# Patient Record
Sex: MALE | Race: WHITE | NOT HISPANIC OR LATINO | Employment: FULL TIME | URBAN - METROPOLITAN AREA
[De-identification: names, ages, dates, MRNs, and addresses within clinical notes are randomized per-mention and may not be internally consistent; named-entity substitution may affect disease eponyms.]

---

## 2022-02-05 ENCOUNTER — APPOINTMENT (EMERGENCY)
Dept: RADIOLOGY | Facility: HOSPITAL | Age: 32
End: 2022-02-05
Payer: COMMERCIAL

## 2022-02-05 ENCOUNTER — HOSPITAL ENCOUNTER (EMERGENCY)
Facility: HOSPITAL | Age: 32
Discharge: HOME | End: 2022-02-05
Attending: EMERGENCY MEDICINE
Payer: COMMERCIAL

## 2022-02-05 VITALS
HEART RATE: 85 BPM | RESPIRATION RATE: 20 BRPM | HEIGHT: 71 IN | BODY MASS INDEX: 25.2 KG/M2 | DIASTOLIC BLOOD PRESSURE: 80 MMHG | WEIGHT: 180 LBS | TEMPERATURE: 99 F | SYSTOLIC BLOOD PRESSURE: 125 MMHG | OXYGEN SATURATION: 99 %

## 2022-02-05 DIAGNOSIS — S43.015A ANTERIOR SHOULDER DISLOCATION, LEFT, INITIAL ENCOUNTER: Primary | ICD-10-CM

## 2022-02-05 PROCEDURE — 73020 X-RAY EXAM OF SHOULDER: CPT

## 2022-02-05 PROCEDURE — 99283 EMERGENCY DEPT VISIT LOW MDM: CPT

## 2022-02-05 PROCEDURE — 73030 X-RAY EXAM OF SHOULDER: CPT

## 2022-02-05 PROCEDURE — 99285 EMERGENCY DEPT VISIT HI MDM: CPT | Performed by: PHYSICIAN ASSISTANT

## 2022-02-05 PROCEDURE — 23650 CLTX SHO DSLC W/MNPJ WO ANES: CPT | Performed by: PHYSICIAN ASSISTANT

## 2022-02-05 RX ORDER — FENTANYL CITRATE 50 UG/ML
75 INJECTION, SOLUTION INTRAMUSCULAR; INTRAVENOUS ONCE
Status: COMPLETED | OUTPATIENT
Start: 2022-02-05 | End: 2022-02-05

## 2022-02-05 RX ORDER — MIDAZOLAM HYDROCHLORIDE 2 MG/2ML
2 INJECTION, SOLUTION INTRAMUSCULAR; INTRAVENOUS ONCE
Status: COMPLETED | OUTPATIENT
Start: 2022-02-05 | End: 2022-02-05

## 2022-02-05 RX ORDER — BUPIVACAINE HYDROCHLORIDE 2.5 MG/ML
10 INJECTION, SOLUTION EPIDURAL; INFILTRATION; INTRACAUDAL ONCE
Status: COMPLETED | OUTPATIENT
Start: 2022-02-05 | End: 2022-02-05

## 2022-02-05 RX ORDER — IBUPROFEN 600 MG/1
600 TABLET ORAL EVERY 6 HOURS PRN
Qty: 30 TABLET | Refills: 0 | Status: SHIPPED | OUTPATIENT
Start: 2022-02-05

## 2022-02-05 RX ADMIN — BUPIVACAINE HYDROCHLORIDE 10 ML: 2.5 INJECTION, SOLUTION EPIDURAL; INFILTRATION; INTRACAUDAL; PERINEURAL at 18:28

## 2022-02-05 RX ADMIN — FENTANYL CITRATE 75 MCG: 50 INJECTION INTRAMUSCULAR; INTRAVENOUS at 18:07

## 2022-02-05 RX ADMIN — MIDAZOLAM 2 MG: 1 INJECTION INTRAMUSCULAR; INTRAVENOUS at 18:07

## 2022-02-05 NOTE — DISCHARGE INSTRUCTIONS
Ibuprofen every 6 hours as needed for pain  You may also take a 1000 mg of Tylenol 3 times daily  It is safe to take with ibuprofen  You may also apply ice  Stay in sling until cleared by Orthopedics  Return to the ER with any significant change or worsening of your symptoms

## 2022-02-06 NOTE — ED PROVIDER NOTES
History  Chief Complaint   Patient presents with    Shoulder Injury     states his shoulder popped out while pulling a bow back to shoot     26-year-old male history of right shoulder dislocation presents complaining of left shoulder pain  Patient states this feels exactly like his prior shoulder dislocations  Reports that he was drawing back on his bone marrow and felt his shoulder dislocate  Denies any other trauma  Has not yet taken anything for pain  Happened approximately 1 hour prior to arrival   Denies any other complaints or concerns at this time  None       History reviewed  No pertinent past medical history  History reviewed  No pertinent surgical history  History reviewed  No pertinent family history  I have reviewed and agree with the history as documented  E-Cigarette/Vaping     E-Cigarette/Vaping Substances     Social History     Tobacco Use    Smoking status: Current Every Day Smoker     Packs/day: 0 50    Smokeless tobacco: Never Used   Substance Use Topics    Alcohol use: Not Currently    Drug use: Never       Review of Systems   Constitutional: Negative for chills, fatigue and fever  HENT: Negative for congestion and sore throat  Eyes: Negative for pain  Respiratory: Negative for cough, chest tightness, shortness of breath and wheezing  Cardiovascular: Negative for chest pain, palpitations and leg swelling  Gastrointestinal: Negative for abdominal pain, constipation, diarrhea, nausea and vomiting  Endocrine: Negative for polyuria  Genitourinary: Negative for dysuria  Musculoskeletal: Positive for arthralgias  Negative for back pain, myalgias and neck pain  Skin: Negative for rash  Neurological: Negative for dizziness, syncope, light-headedness and headaches  All other systems reviewed and are negative  Physical Exam  Physical Exam  Vitals reviewed  Constitutional:       Appearance: Normal appearance  He is well-developed     HENT: Head: Normocephalic and atraumatic  Mouth/Throat:      Mouth: Mucous membranes are moist    Eyes:      Conjunctiva/sclera: Conjunctivae normal    Cardiovascular:      Rate and Rhythm: Normal rate and regular rhythm  Heart sounds: Normal heart sounds  Pulmonary:      Effort: Pulmonary effort is normal       Breath sounds: Normal breath sounds  Abdominal:      General: Bowel sounds are normal       Palpations: Abdomen is soft  Tenderness: There is no abdominal tenderness  Musculoskeletal:      Cervical back: Normal range of motion  Comments: Left humeral head palpable with anterior deformity  Arm at patient's side  5/5 strength in wrist extension  2+ radial pulse  Brisk cap refill  Distal sensation intact  No deltoid numbness  Skin:     General: Skin is warm and dry  Capillary Refill: Capillary refill takes less than 2 seconds  Neurological:      General: No focal deficit present  Mental Status: He is alert and oriented to person, place, and time     Psychiatric:         Mood and Affect: Mood normal          Behavior: Behavior normal          Vital Signs  ED Triage Vitals [02/05/22 1722]   Temperature Pulse Respirations Blood Pressure SpO2   99 °F (37 2 °C) 85 20 125/80 99 %      Temp Source Heart Rate Source Patient Position - Orthostatic VS BP Location FiO2 (%)   Temporal Monitor Sitting Left arm --      Pain Score       10 - Worst Possible Pain           Vitals:    02/05/22 1722   BP: 125/80   Pulse: 85   Patient Position - Orthostatic VS: Sitting         Visual Acuity      ED Medications  Medications   fentanyl citrate (PF) 100 MCG/2ML 75 mcg (75 mcg Intravenous Given 2/5/22 1807)   midazolam (VERSED) injection 2 mg (2 mg Intravenous Given 2/5/22 1807)   bupivacaine (PF) (MARCAINE) 0 25 % injection 10 mL (10 mL Infiltration Given 2/5/22 1828)       Diagnostic Studies  Results Reviewed     None                 XR shoulder 2+ views LEFT   ED Interpretation by Iris Liang JAREN Dalal (02/05 1807)   Anterior glenohumeral dislocation      XR shoulder 1 vw LEFT POST REDUCTION    (Results Pending)              Procedures  Orthopedic injury treatment    Date/Time: 2/5/2022 8:56 PM  Performed by: Ghanshyam Ocasio PA-C  Authorized by: Ghanshyam Ocasio PA-C     Patient Location:  ED  Other Assisting Provider: Yes (comment)    Consent given by:  Patient  Patient states understanding of procedure being performed: Yes    Patient identity confirmed:  Verbally with patient  Injury location:  Shoulder  Location details:  Left shoulder  Injury type:  Dislocation  Dislocation type: anterior    Chronicity:  New  Neurovascular status: Neurovascularly intact    Distal perfusion: normal    Neurological function: normal    Range of motion: normal    Local anesthesia used?: Yes    General anesthesia used?: No    Local anesthetic:  Lidocaine 1% without epinephrine  Skeletal traction used?: Yes    Immobilization:  Sling  Neurovascular status: Neurovascularly intact    Distal perfusion: normal    Neurological function: normal    Range of motion: normal    Patient tolerance:  Patient tolerated the procedure well with no immediate complications   Utilized Garcia technique with successful reduction             ED Course                                             MDM  Number of Diagnoses or Management Options  Anterior shoulder dislocation, left, initial encounter  Diagnosis management comments: Patient presented with anterior left shoulder dislocation  This is his 1st dislocation  Gave intra-articular bupivacaine with adequate relief of his pain  Versed fentanyl use  Cunningham technique utilized with successful reduction  Patient placed in splint  Referred to orthopedics  All questions were answered he was agreeable to plan  Wife will drive him home        Disposition  Final diagnoses:   Anterior shoulder dislocation, left, initial encounter     Time reflects when diagnosis was documented in both MDM as applicable and the Disposition within this note     Time User Action Codes Description Comment    2/5/2022  6:42 PM Neno Ziegler Add [[id number] [id number]] Anterior shoulder dislocation, left, initial encounter       ED Disposition     ED Disposition Condition Date/Time Comment    Discharge Stable Sat Feb 5, 2022  6:42 PM Coral Furth discharge to home/self care              Follow-up Information    None         Discharge Medication List as of 2/5/2022  6:46 PM      START taking these medications    Details   ibuprofen (MOTRIN) 600 mg tablet Take 1 tablet (600 mg total) by mouth every 6 (six) hours as needed for mild pain, Starting Sat 2/5/2022, Normal                 PDMP Review     None          ED Provider  Electronically Signed by           Briana Kennedy PA-C  02/05/22 2059

## 2022-02-14 VITALS
BODY MASS INDEX: 25.2 KG/M2 | HEIGHT: 71 IN | WEIGHT: 180 LBS | SYSTOLIC BLOOD PRESSURE: 128 MMHG | DIASTOLIC BLOOD PRESSURE: 78 MMHG

## 2022-02-14 DIAGNOSIS — S43.432A TEAR OF LEFT GLENOID LABRUM, INITIAL ENCOUNTER: Primary | ICD-10-CM

## 2022-02-14 DIAGNOSIS — S43.015A ANTERIOR SHOULDER DISLOCATION, LEFT, INITIAL ENCOUNTER: ICD-10-CM

## 2022-02-14 PROCEDURE — 99203 OFFICE O/P NEW LOW 30 MIN: CPT | Performed by: ORTHOPAEDIC SURGERY

## 2022-02-14 NOTE — PROGRESS NOTES
ASSESSMENT/PLAN:    Diagnoses and all orders for this visit:    Tear of left glenoid labrum, initial encounter  -     MRI arthrogram left shoulder; Future  -     FL injection left shoulder (arthrogram); Future    Anterior shoulder dislocation, left, initial encounter  -     Ambulatory Referral to Orthopedic Surgery  -     MRI arthrogram left shoulder; Future  -     FL injection left shoulder (arthrogram); Future        The patient's shoulder is now reduced  I am concerned, however, that the patient has a tear of his labrum  We will order an MRI arthrogram to rule this out  He will follow up with our office once the MRI arthrogram is complete  The patient is acceptable to this plan  Return for MRI results  The patient dislocated left shoulder  There is a possibility he may have a tear of his labrum  An MRI arthrogram is the gold standard to look for this  Return back once this is complete  Physical exam shows tenderness along his anterior shoulder with a positive speed's test indicative of labral tearing  The shoulder feels contained today  Rotator cuff strength testing was grossly intact      _____________________________________________________  CHIEF COMPLAINT:  Chief Complaint   Patient presents with    Left Shoulder - Dislocation         SUBJECTIVE:  Serge Han is a 27 y o  male who presents to our office complaining of left shoulder pain  The patient states that on 02/05/2022 he was pulling back on a bow when his left shoulder dislocated  At that point, he went to the emergency department were a successful reduction was achieved  He states that he has also had a shoulder dislocation of his right shoulder in the past   He denies any numbness or tingling  He denies any fever or chills        The following portions of the patient's history were reviewed and updated as appropriate: allergies, current medications, past family history, past medical history, past social history, past surgical history and problem list     PAST MEDICAL HISTORY:  History reviewed  No pertinent past medical history  PAST SURGICAL HISTORY:  Past Surgical History:   Procedure Laterality Date    MOUTH SURGERY         FAMILY HISTORY:  History reviewed  No pertinent family history  SOCIAL HISTORY:  Social History     Tobacco Use    Smoking status: Current Every Day Smoker     Packs/day: 0 50    Smokeless tobacco: Never Used   Substance Use Topics    Alcohol use: Not Currently    Drug use: Never       MEDICATIONS:    Current Outpatient Medications:     ibuprofen (MOTRIN) 600 mg tablet, Take 1 tablet (600 mg total) by mouth every 6 (six) hours as needed for mild pain (Patient not taking: Reported on 2/14/2022 ), Disp: 30 tablet, Rfl: 0    ALLERGIES:  No Known Allergies    ROS:  Review of Systems     Constitutional: Negative for fatigue, fever or loss of appetite  HENT: Negative  Respiratory: Negative for shortness of breath, dyspnea  Cardiovascular: Negative for chest pain/tightness  Gastrointestinal: Negative for abdominal pain, N/V  Endocrine: Negative for cold/heat intolerance, unexplained weight loss/gain  Genitourinary: Negative for flank pain, dysuria, hematuria  Musculoskeletal: Positive for arthralgia   Skin: Negative for rash  Neurological: Negative for numbness or tingling  Psychiatric/Behavioral: Negative for agitation  _____________________________________________________  PHYSICAL EXAMINATION:    Blood pressure 128/78, height 5' 11" (1 803 m), weight 81 6 kg (180 lb)  Constitutional: Oriented to person, place, and time  Appears well-developed and well-nourished  No distress  HENT:   Head: Normocephalic  Eyes: Conjunctivae are normal  Right eye exhibits no discharge  Left eye exhibits no discharge  No scleral icterus  Cardiovascular: Normal rate  Pulmonary/Chest: Effort normal    Neurological: Alert and oriented to person, place, and time  Skin: Skin is warm and dry   No rash noted  Not diaphoretic  No erythema  No pallor  Psychiatric: Normal mood and affect  Behavior is normal  Judgment and thought content normal       MUSCULOSKELETAL EXAMINATION:   Physical Exam  Ortho Exam    Left upper extremity is neurovascular intact  Fingers are pink and mobile  Compartments are soft  There is apprehension along the shoulder  Range of motion of the shoulder is reduced  Brisk cap refill  Sensation intact  Objective:  BP Readings from Last 1 Encounters:   02/14/22 128/78      Wt Readings from Last 1 Encounters:   02/14/22 81 6 kg (180 lb)        BMI:   Estimated body mass index is 25 1 kg/m² as calculated from the following:    Height as of this encounter: 5' 11" (1 803 m)  Weight as of this encounter: 81 6 kg (180 lb)            Elmo Agudelo PA-C

## 2022-02-21 ENCOUNTER — HOSPITAL ENCOUNTER (OUTPATIENT)
Dept: MRI IMAGING | Facility: HOSPITAL | Age: 32
Discharge: HOME | End: 2022-02-21
Attending: PHYSICIAN ASSISTANT
Payer: COMMERCIAL

## 2022-02-21 ENCOUNTER — HOSPITAL ENCOUNTER (OUTPATIENT)
Dept: RADIOLOGY | Facility: HOSPITAL | Age: 32
Discharge: HOME | End: 2022-02-21
Attending: PHYSICIAN ASSISTANT | Admitting: RADIOLOGY
Payer: COMMERCIAL

## 2022-02-21 DIAGNOSIS — S43.432A TEAR OF LEFT GLENOID LABRUM, INITIAL ENCOUNTER: ICD-10-CM

## 2022-02-21 DIAGNOSIS — S43.015A ANTERIOR SHOULDER DISLOCATION, LEFT, INITIAL ENCOUNTER: ICD-10-CM

## 2022-02-21 PROCEDURE — 73222 MRI JOINT UPR EXTREM W/DYE: CPT

## 2022-02-21 PROCEDURE — G1004 CDSM NDSC: HCPCS

## 2022-02-21 PROCEDURE — 23350 INJECTION FOR SHOULDER X-RAY: CPT

## 2022-02-21 PROCEDURE — A9585 GADOBUTROL INJECTION: HCPCS | Performed by: PHYSICIAN ASSISTANT

## 2022-02-21 PROCEDURE — 77002 NEEDLE LOCALIZATION BY XRAY: CPT

## 2022-02-21 RX ORDER — LIDOCAINE WITH 8.4% SOD BICARB 0.9%(10ML)
5 SYRINGE (ML) INJECTION ONCE
Status: DISCONTINUED | OUTPATIENT
Start: 2022-02-21 | End: 2022-02-25 | Stop reason: HOSPADM

## 2022-02-21 RX ADMIN — GADOBUTROL 2 ML: 604.72 INJECTION INTRAVENOUS at 14:57

## 2022-02-21 RX ADMIN — IOHEXOL 5 ML: 300 INJECTION, SOLUTION INTRAVENOUS at 14:39

## 2022-02-28 VITALS
DIASTOLIC BLOOD PRESSURE: 76 MMHG | HEIGHT: 71 IN | BODY MASS INDEX: 25.2 KG/M2 | SYSTOLIC BLOOD PRESSURE: 122 MMHG | WEIGHT: 180 LBS

## 2022-02-28 DIAGNOSIS — S43.432A TEAR OF LEFT GLENOID LABRUM, INITIAL ENCOUNTER: Primary | ICD-10-CM

## 2022-02-28 PROCEDURE — 99213 OFFICE O/P EST LOW 20 MIN: CPT | Performed by: ORTHOPAEDIC SURGERY

## 2022-02-28 NOTE — PROGRESS NOTES
ASSESSMENT/PLAN:    Diagnoses and all orders for this visit:    Tear of left glenoid labrum, initial encounter  -     Ambulatory Referral to Physical Therapy; Future        An MRI arthrogram of the patient's left shoulder was consistent a glenoid labral tear  Possible treatment options were discussed with the patient including surgical intervention  The patient would like to proceed conservatively at this point  We will give him a referral for physical therapy for strengthening, stretching and range of motion  He will follow up with our office in 6 weeks  The patient is acceptable to this plan  Return in about 6 weeks (around 4/11/2022)  The patient has a torn labrum along the anterior aspect  Unfortunate, does not want any surgical intervention because of work requirements  He understands that he is predisposed to recurrent dislocations  Shoulder parameters were discussed the minimize this  He has agreed to start therapy  Return back in 6 weeks for re-evaluation  If his condition changes, he will not hesitate to let us know  There is less apprehension on today's visit  No gross instability       _____________________________________________________  CHIEF COMPLAINT:  Chief Complaint   Patient presents with    Left Shoulder - Follow-up         SUBJECTIVE:  Sofia Vides is a 27 y o  male who presents to our office to review MRI arthrogram results of his left shoulder  He is still complaining of some left shoulder discomfort  He denies any new falls or trauma  He denies any numbness or tingling  He denies any fever or chills  The following portions of the patient's history were reviewed and updated as appropriate: allergies, current medications, past family history, past medical history, past social history, past surgical history and problem list     PAST MEDICAL HISTORY:  History reviewed  No pertinent past medical history      PAST SURGICAL HISTORY:  Past Surgical History:   Procedure Laterality Date    FL INJECTION LEFT SHOULDER (ARTHROGRAM)  2/21/2022    MOUTH SURGERY         FAMILY HISTORY:  History reviewed  No pertinent family history  SOCIAL HISTORY:  Social History     Tobacco Use    Smoking status: Current Every Day Smoker     Packs/day: 0 50    Smokeless tobacco: Never Used   Vaping Use    Vaping Use: Some days    Substances: Nicotine, Flavoring   Substance Use Topics    Alcohol use: Not Currently    Drug use: Never       MEDICATIONS:    Current Outpatient Medications:     ibuprofen (MOTRIN) 600 mg tablet, Take 1 tablet (600 mg total) by mouth every 6 (six) hours as needed for mild pain (Patient not taking: Reported on 2/14/2022 ), Disp: 30 tablet, Rfl: 0    ALLERGIES:  No Known Allergies    ROS:  Review of Systems     Constitutional: Negative for fatigue, fever or loss of appetite  HENT: Negative  Respiratory: Negative for shortness of breath, dyspnea  Cardiovascular: Negative for chest pain/tightness  Gastrointestinal: Negative for abdominal pain, N/V  Endocrine: Negative for cold/heat intolerance, unexplained weight loss/gain  Genitourinary: Negative for flank pain, dysuria, hematuria  Musculoskeletal: Positive for arthralgia   Skin: Negative for rash  Neurological: Negative for numbness or tingling  Psychiatric/Behavioral: Negative for agitation  _____________________________________________________  PHYSICAL EXAMINATION:    Blood pressure 122/76, height 5' 11" (1 803 m), weight 81 6 kg (180 lb)  Constitutional: Oriented to person, place, and time  Appears well-developed and well-nourished  No distress  HENT:   Head: Normocephalic  Eyes: Conjunctivae are normal  Right eye exhibits no discharge  Left eye exhibits no discharge  No scleral icterus  Cardiovascular: Normal rate  Pulmonary/Chest: Effort normal    Neurological: Alert and oriented to person, place, and time  Skin: Skin is warm and dry  No rash noted  Not diaphoretic  No erythema  No pallor  Psychiatric: Normal mood and affect  Behavior is normal  Judgment and thought content normal       MUSCULOSKELETAL EXAMINATION:   Physical Exam  Ortho Exam    Left upper extremity is neurovascularly intact  Fingers are pink and mobile  Compartments are soft  Range of motion of the shoulder is from 0-150 degrees of forward flexion and abduction  Tenderness to palpation along labral region  Brisk cap refill  Sensation intact  Objective:  BP Readings from Last 1 Encounters:   02/28/22 122/76      Wt Readings from Last 1 Encounters:   02/28/22 81 6 kg (180 lb)        BMI:   Estimated body mass index is 25 1 kg/m² as calculated from the following:    Height as of this encounter: 5' 11" (1 803 m)  Weight as of this encounter: 81 6 kg (180 lb)            John Ellis PA-C

## 2022-03-10 ENCOUNTER — EVALUATION (OUTPATIENT)
Dept: PHYSICAL THERAPY | Facility: CLINIC | Age: 32
End: 2022-03-10
Payer: COMMERCIAL

## 2022-03-10 DIAGNOSIS — M21.822 HILL SACHS DEFORMITY, LEFT: ICD-10-CM

## 2022-03-10 DIAGNOSIS — S43.432A TEAR OF LEFT GLENOID LABRUM, INITIAL ENCOUNTER: ICD-10-CM

## 2022-03-10 DIAGNOSIS — S43.432D TEAR OF LEFT GLENOID LABRUM, SUBSEQUENT ENCOUNTER: Primary | ICD-10-CM

## 2022-03-10 PROCEDURE — 97535 SELF CARE MNGMENT TRAINING: CPT

## 2022-03-10 PROCEDURE — 97161 PT EVAL LOW COMPLEX 20 MIN: CPT

## 2022-03-10 NOTE — LETTER
March 10, 2022    Bari Estrada DO  [address] 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    3  Tear of left glenoid labrum, initial encounter  [id number] Ambulatory Referral to Physical Therapy       Dear Dr Garcias Kevin:    Thank you for your recent referral of Neha Liz  Please review the attached evaluation summary from [facility] recent visit  Please verify that you agree with the plan of care by signing the attached order  If you have any questions or concerns, please do not hesitate to call  I sincerely appreciate the opportunity to share in the care of one of your patients and hope to have another opportunity to work with you in the near future  Sincerely,    Angely Lal, PT      Referring Provider:      I certify that I have read the below Plan of Care and certify the need for these services furnished under this plan of treatment while under my care  Bari Estrada DO  [address] [facility] [address]  [address] [place]          PT Evaluation     Today's date: 3/10/2022  Patient name: Neha Liz  : 1990  MRN: [id number]  Referring provider: Dallin Ren DO  Dx:   Encounter Diagnosis     ICD-10-CM    1  Tear of left glenoid labrum, subsequent encounter  S43 432D    2  Hill Sachs deformity, left  [id number]                   Assessment  Assessment details: Pt presents with signs and symptoms consistent with referring diagnosis  Pt is functioning fairly well with work and ADL tasks but is definitely excessively stressing his right (history of multiple dislocations    Impairments: abnormal or restricted ROM, impaired physical strength, lacks appropriate home exercise program, pain with function and poor posture     Symptom irritability: moderateUnderstanding of Dx/Px/POC: good   Prognosis: good    Goals  ST) Pt  Will be able to perform all upper body dressing and grooming without limitation in 6 weeks  2) Pt  Will be able to complete full day of work activities with minimal complaints and slight modificaitons in 6 weeks  LT) Pt  Will be able to perform all heavy lifting and pushing chores at home without pain or limitation in 12 weeks  2)  Pt  Will be able to perform all work tasks for a full day at pre-morbid levels in 12 weeks  Plan  Patient would benefit from: skilled physical therapy and PT eval  Planned modality interventions: cryotherapy, thermotherapy: hydrocollator packs and unattended electrical stimulation  Planned therapy interventions: manual therapy, neuromuscular re-education, patient education, self care, therapeutic exercise, therapeutic activities and home exercise program  Frequency: 3x week  Duration in weeks: 12  Treatment plan discussed with: patient        Subjective Evaluation    History of Present Illness  Date of onset: 2022  Mechanism of injury: trauma  Mechanism of injury: Drawing a bow          Not a recurrent problem   Quality of life: fair    Pain  Current pain ratin  At best pain ratin  At worst pain ratin  Location: anterior joint line of left shoulder  Quality: dull ache and discomfort  Relieving factors: rest and change in position  Aggravating factors: overhead activity and lifting      Diagnostic Tests  MRI studies: abnormal (left labral tear (3:00-6:00))    FCE comments:  for [employer]   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, hook up pneumatics  He also rolls carts to lift gait, 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  There is some overhead lifting involved  Treatments  No previous or current treatments  Patient Goals  Patient goal: Avoid surgery and be able to safely work without symptoms and return to 100% use of shoulder        Objective     Active Range of Motion   Left Shoulder   Flexion: 160 degrees   Extension: WFL  Abduction: 120 degrees with pain  External rotation 90°: 70 degrees   Internal rotation 90°: 78 degrees     Strength/Myotome Testing     Left Shoulder     Planes of Motion   Flexion: 3+   Abduction: 3+   External rotation at 0°: 3-   Internal rotation at 90°: 3+     Tests     Left Shoulder   Positive anterior load and shift, apprehension and belly press  Negative drop arm, empty can, horn blower, lift-off, Neer's, painful arc, posterior load and shift, Speed's, Yergason's and bicep load                 Precautions: History of right shoulder multi-directional instability and dislocation      Manuals 3-10-22            PROM left shoulder                                                    Neuro Re-Ed                                                                                                        Ther Ex             UBE                                                                                                        Ther Activity             LTP/MTP while holding good posture             Shoulder Isometrics             IR/ ER               Weight shifts on table                                       Pt Ed/ HEP Pathology and involved anatomy as well as issues and reviewed HEP 14'                         Gait Training                                       Modalities

## 2022-03-10 NOTE — PROGRESS NOTES
PT Evaluation     Today's date: 3/10/2022  Patient name: Ava Haley  : 1990  MRN: [id number]  Referring provider: Ana Blevins DO  Dx:   Encounter Diagnosis     ICD-10-CM    1  Tear of left glenoid labrum, subsequent encounter  S43 432D    2  Hill Sachs deformity, left  [id number]                   Assessment  Assessment details: Pt presents with signs and symptoms consistent with referring diagnosis  Pt is functioning fairly well with work and ADL tasks but is definitely excessively stressing his right (history of multiple dislocations  Impairments: abnormal or restricted ROM, impaired physical strength, lacks appropriate home exercise program, pain with function and poor posture     Symptom irritability: moderateUnderstanding of Dx/Px/POC: good   Prognosis: good    Goals  ST) Pt  Will be able to perform all upper body dressing and grooming without limitation in 6 weeks  2) Pt  Will be able to complete full day of work activities with minimal complaints and slight modificaitons in 6 weeks  LT) Pt  Will be able to perform all heavy lifting and pushing chores at home without pain or limitation in 12 weeks  2)  Pt  Will be able to perform all work tasks for a full day at pre-morbid levels in 12 weeks      Plan  Patient would benefit from: skilled physical therapy and PT eval  Planned modality interventions: cryotherapy, thermotherapy: hydrocollator packs and unattended electrical stimulation  Planned therapy interventions: manual therapy, neuromuscular re-education, patient education, self care, therapeutic exercise, therapeutic activities and home exercise program  Frequency: 3x week  Duration in weeks: 12  Treatment plan discussed with: patient        Subjective Evaluation    History of Present Illness  Date of onset: 2022  Mechanism of injury: trauma  Mechanism of injury: Drawing a bow          Not a recurrent problem   Quality of life: fair    Pain  Current pain ratin  At best pain ratin  At worst pain ratin  Location: anterior joint line of left shoulder  Quality: dull ache and discomfort  Relieving factors: rest and change in position  Aggravating factors: overhead activity and lifting      Diagnostic Tests  MRI studies: abnormal (left labral tear (3:00-6:00))    FCE comments:  for [employer]   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, hook up pneumatics  He also rolls carts to lift gait, 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  There is some overhead lifting involved  Treatments  No previous or current treatments  Patient Goals  Patient goal: Avoid surgery and be able to safely work without symptoms and return to 100% use of shoulder        Objective     Active Range of Motion   Left Shoulder   Flexion: 160 degrees   Extension: WFL  Abduction: 120 degrees with pain  External rotation 90°: 70 degrees   Internal rotation 90°: 78 degrees     Strength/Myotome Testing     Left Shoulder     Planes of Motion   Flexion: 3+   Abduction: 3+   External rotation at 0°: 3-   Internal rotation at 90°: 3+     Tests     Left Shoulder   Positive anterior load and shift, apprehension and belly press  Negative drop arm, empty can, horn blower, lift-off, Neer's, painful arc, posterior load and shift, Speed's, Yergason's and bicep load                 Precautions: History of right shoulder multi-directional instability and dislocation      Manuals 3-10-22            PROM left shoulder                                                    Neuro Re-Ed                                                                                                        Ther Ex             UBE                                                                                                        Ther Activity             LTP/MTP while holding good posture             Shoulder Isometrics             IR/ ER Weight shifts on table                                       Pt Ed/ HEP Pathology and involved anatomy as well as issues and reviewed HEP 14'                         Gait Training                                       Modalities

## 2022-03-15 ENCOUNTER — APPOINTMENT (OUTPATIENT)
Dept: PHYSICAL THERAPY | Facility: CLINIC | Age: 32
End: 2022-03-15
Payer: COMMERCIAL

## 2022-03-17 ENCOUNTER — OFFICE VISIT (OUTPATIENT)
Dept: PHYSICAL THERAPY | Facility: CLINIC | Age: 32
End: 2022-03-17
Payer: COMMERCIAL

## 2022-03-17 DIAGNOSIS — S43.432A TEAR OF LEFT GLENOID LABRUM, INITIAL ENCOUNTER: ICD-10-CM

## 2022-03-17 DIAGNOSIS — M21.822 HILL SACHS DEFORMITY, LEFT: ICD-10-CM

## 2022-03-17 DIAGNOSIS — S43.432D TEAR OF LEFT GLENOID LABRUM, SUBSEQUENT ENCOUNTER: Primary | ICD-10-CM

## 2022-03-17 PROCEDURE — 97530 THERAPEUTIC ACTIVITIES: CPT

## 2022-03-17 PROCEDURE — 97110 THERAPEUTIC EXERCISES: CPT

## 2022-03-17 NOTE — PROGRESS NOTES
Daily Note     Today's date: 3/17/2022  Patient name: Ava Haley  : 1990  MRN: [id number]  Referring provider: Ana Blevins DO  Dx:   Encounter Diagnosis     ICD-10-CM    1  Tear of left glenoid labrum, subsequent encounter  S43 432D    2  Hill Sachs deformity, left  M21 822    3  Tear of left glenoid labrum, initial encounter  S43 432A                   Subjective: Pt reports soreness but no significant pain or dislocations      Objective: See treatment diary below      Assessment: Tolerated treatment well  Patient would benefit from continued PT      Plan: Progress treatment as tolerated         Precautions: History of right shoulder multi-directional instability and dislocation      Manuals 3-10-22 3-17-22           PROM left shoulder                                                    Neuro Re-Ed                                                                                                        Ther Ex             UBE  10' Retro                                                                                                      Ther Activity             LTP/MTP while holding good posture  Blue   3x10 each           Shoulder Isometrics  HEP           IR/ ER    Blue  3x10           Weight shifts on table  BOSU  4'           Bilateral wall bolster rolls  2x10           Body blade  IR/ ER/Flex/ ext  6'           Weighted ball bounce and catch off wall  Red  5' w/ 1 rest                                                                                         Pt Ed/ HEP Pathology and involved anatomy as well as issues and reviewed HEP 14'                         Gait Training                                       Modalities

## 2022-03-22 ENCOUNTER — OFFICE VISIT (OUTPATIENT)
Dept: PHYSICAL THERAPY | Facility: CLINIC | Age: 32
End: 2022-03-22
Payer: COMMERCIAL

## 2022-03-22 DIAGNOSIS — S43.432A TEAR OF LEFT GLENOID LABRUM, INITIAL ENCOUNTER: ICD-10-CM

## 2022-03-22 DIAGNOSIS — M21.822 HILL SACHS DEFORMITY, LEFT: ICD-10-CM

## 2022-03-22 DIAGNOSIS — S43.432D TEAR OF LEFT GLENOID LABRUM, SUBSEQUENT ENCOUNTER: Primary | ICD-10-CM

## 2022-03-22 PROCEDURE — 97530 THERAPEUTIC ACTIVITIES: CPT

## 2022-03-22 PROCEDURE — 97110 THERAPEUTIC EXERCISES: CPT

## 2022-03-22 NOTE — PROGRESS NOTES
Daily Note     Today's date: 3/22/2022  Patient name: Lucas Chan  : 1990  MRN: [id number]  Referring provider: Simone Mensah DO  Dx:   Encounter Diagnosis     ICD-10-CM    1  Tear of left glenoid labrum, subsequent encounter  S43 432D    2  Hill Sachs deformity, left  M21 822    3  Tear of left glenoid labrum, initial encounter  S43 432A                   Subjective: Pt reports being a little sore from the ball rapid bounce on the wall but, other than that, is not having any real issues  Objective: See treatment diary below      Assessment: Tolerated treatment well  Patient would benefit from continued PT      Plan: Progress treatment as tolerated         Precautions: History of right shoulder multi-directional instability and dislocation      Manuals 3-10-22 3-17-22 3-22-22          PROM left shoulder                                                    Neuro Re-Ed                                                                                                        Ther Ex             UBE  10' Retro 10' Retro          ER shoulder cirquit   Red 30x3 rapid                                                                                        Ther Activity             LTP/MTP while holding good posture  Blue   3x10 each Blue 3x15 each          Shoulder Isometrics  HEP           IR/ ER    Blue  3x10 Blue IR 3x10  Green ER 3x10          Weight shifts on table  BOSU  4' BOSU 4'          Bilateral wall bolster rolls  2x10 2x12          Body blade  IR/ ER/Flex/ ext  6' 6'          Weighted ball bounce and catch off wall  Red  5' w/ 1 rest Hold this visit          Scap retraction w/ ER   Green 3x10          Seated scapular depression   2x10                                                              Pt Ed/ HEP Pathology and involved anatomy as well as issues and reviewed HEP 14'                         Gait Training                                       Modalities

## 2022-03-24 ENCOUNTER — OFFICE VISIT (OUTPATIENT)
Dept: PHYSICAL THERAPY | Facility: CLINIC | Age: 32
End: 2022-03-24
Payer: COMMERCIAL

## 2022-03-24 DIAGNOSIS — S43.432D TEAR OF LEFT GLENOID LABRUM, SUBSEQUENT ENCOUNTER: Primary | ICD-10-CM

## 2022-03-24 DIAGNOSIS — M21.822 HILL SACHS DEFORMITY, LEFT: ICD-10-CM

## 2022-03-24 DIAGNOSIS — S43.432A TEAR OF LEFT GLENOID LABRUM, INITIAL ENCOUNTER: ICD-10-CM

## 2022-03-24 PROCEDURE — 97530 THERAPEUTIC ACTIVITIES: CPT

## 2022-03-24 PROCEDURE — 97110 THERAPEUTIC EXERCISES: CPT

## 2022-03-24 NOTE — PROGRESS NOTES
Daily Note     Today's date: 3/24/2022  Patient name: Rikki Almanza  : 1990  MRN: [id number]  Referring provider: Ronald Rosa DO  Dx:   Encounter Diagnosis     ICD-10-CM    1  Tear of left glenoid labrum, subsequent encounter  S43 432D    2  Hill Sachs deformity, left  M21 822    3  Tear of left glenoid labrum, initial encounter  S43 432A                   Subjective: Pt reports his shoulder is feeling really good today and yesterday      Objective: See treatment diary below      Assessment: Tolerated treatment well  Patient would benefit from continued PT      Plan: Progress treatment as tolerated         Precautions: History of right shoulder multi-directional instability and dislocation      Manuals 3-10-22 3-17-22 3-22-22 3-24-22         PROM left shoulder                                                    Neuro Re-Ed                                                                                                        Ther Ex             UBE  10' Retro 10' Retro 5'/5'         ER shoulder cirquit   Red 30x3 rapid Green 30x rapid                                                                                       Ther Activity             LTP/MTP while holding good posture  Blue   3x10 each Blue 3x15 each Blue 3x15 each         Shoulder Isometrics  HEP           IR/ ER    Blue  3x10 Blue IR 3x10  Green ER 3x10 Blue 3x10 each         Weight shifts on table  BOSU  4' BOSU 4' BOSU 4'         Bilateral wall bolster rolls  2x10 2x12 3x10         Body blade  IR/ ER/Flex/ ext  6' 6' 6' arm angle >90          Weighted ball bounce and catch off wall  Red  5' w/ 1 rest Hold this visit ===         Scap retraction w/ ER   Green 3x10 Green 3x10         Seated scapular depression   2x10 2x10                                                             Pt Ed/ HEP Pathology and involved anatomy as well as issues and reviewed HEP 14'                         Gait Training Modalities

## 2022-03-31 ENCOUNTER — OFFICE VISIT (OUTPATIENT)
Dept: PHYSICAL THERAPY | Facility: CLINIC | Age: 32
End: 2022-03-31
Payer: COMMERCIAL

## 2022-03-31 DIAGNOSIS — M21.822 HILL SACHS DEFORMITY, LEFT: ICD-10-CM

## 2022-03-31 DIAGNOSIS — S43.432A TEAR OF LEFT GLENOID LABRUM, INITIAL ENCOUNTER: ICD-10-CM

## 2022-03-31 DIAGNOSIS — S43.432D TEAR OF LEFT GLENOID LABRUM, SUBSEQUENT ENCOUNTER: Primary | ICD-10-CM

## 2022-03-31 PROCEDURE — 97530 THERAPEUTIC ACTIVITIES: CPT

## 2022-03-31 PROCEDURE — 97110 THERAPEUTIC EXERCISES: CPT

## 2022-03-31 NOTE — PROGRESS NOTES
Daily Note     Today's date: 3/31/2022  Patient name: Serge Han  : 1990  MRN: [id number]  Referring provider: Jesusita Vang DO  Dx:   Encounter Diagnosis     ICD-10-CM    1  Tear of left glenoid labrum, subsequent encounter  S43 432D    2  Hill Sachs deformity, left  M21 822    3  Tear of left glenoid labrum, initial encounter  S43 432A                   Subjective: No new complaints      Objective: See treatment diary below      Assessment: Tolerated treatment well  Patient would benefit from continued PT      Plan: Progress treatment as tolerated         Precautions: History of right shoulder multi-directional instability and dislocation      Manuals 3-10-22 3-17-22 3-22-22 3-24-22 3-31-22        PROM left shoulder                                                    Neuro Re-Ed                                                                                                        Ther Ex             UBE  10' Retro 10' Retro 5'/5' 5'/5'        ER shoulder cirquit   Red 30x3 rapid Green 30x rapid Blue 30x  rapid                                                                                      Ther Activity             LTP/MTP while holding good posture  Blue   3x10 each Blue 3x15 each Blue 3x15 each Blue 3x15  each        Shoulder Isometrics  HEP           IR/ ER    Blue  3x10 Blue IR 3x10  Green ER 3x10 Blue 3x10 each Blue 3x10        Weight shifts on table  BOSU  4' BOSU 4' BOSU 4' BOSU 4'        Bilateral wall bolster rolls  2x10 2x12 3x10 3x10        Body blade  IR/ ER/Flex/ ext  6' 6' 6' arm angle >90  6' arm angle >90 degrees        Weighted ball bounce and catch off wall  Red  5' w/ 1 rest Hold this visit === ----        Scap retraction w/ ER   Green 3x10 Green 3x10 Blue 3x10        Seated scapular depression   2x10 2x10 2x10                                                            Pt Ed/ HEP Pathology and involved anatomy as well as issues and reviewed HEP 15' Gait Training                                       Modalities

## 2022-04-18 VITALS
BODY MASS INDEX: 26.04 KG/M2 | HEART RATE: 70 BPM | DIASTOLIC BLOOD PRESSURE: 66 MMHG | WEIGHT: 186 LBS | SYSTOLIC BLOOD PRESSURE: 120 MMHG | HEIGHT: 71 IN

## 2022-04-18 DIAGNOSIS — S43.432D TEAR OF LEFT GLENOID LABRUM, SUBSEQUENT ENCOUNTER: Primary | ICD-10-CM

## 2022-04-18 PROCEDURE — 99213 OFFICE O/P EST LOW 20 MIN: CPT | Performed by: ORTHOPAEDIC SURGERY

## 2022-04-18 NOTE — PROGRESS NOTES
ASSESSMENT/PLAN:    Diagnoses and all orders for this visit:    Tear of left glenoid labrum, subsequent encounter        The patient is doing very well since participating in physical therapy  He states his left shoulder feels stable  He can continue physical therapy until he plateaus  He will follow up with our office as needed  The patient is acceptable to this plan  Return if symptoms worsen or fail to improve  Fortunately, the shoulder stays reduced  He will continue physical therapy for aggressive range of motion, strengthening stretching  Be careful of any apprehension that could read dislocated shoulder  It appears that he does have bilateral multi directional instability along both structures  The importance of a home exercise program is also discussed  Follow up on an as-needed basis  If his condition changes, he will not hesitate to let us know    _____________________________________________________  CHIEF COMPLAINT:  Chief Complaint   Patient presents with    Left Shoulder - Follow-up         SUBJECTIVE:  Jalil Reed is a 27 y o  male who presents to our office for follow-up visit  The patient has a history of a tear of his left glenoid labrum with a shoulder dislocation  He has been participating in physical therapy  He states his shoulder feels stable  He denies any significant pain  He denies any numbness or tingling  He denies any fever or chills  The following portions of the patient's history were reviewed and updated as appropriate: allergies, current medications, past family history, past medical history, past social history, past surgical history and problem list     PAST MEDICAL HISTORY:  History reviewed  No pertinent past medical history  PAST SURGICAL HISTORY:  Past Surgical History:   Procedure Laterality Date    FL INJECTION LEFT SHOULDER (ARTHROGRAM)  2/21/2022    MOUTH SURGERY         FAMILY HISTORY:  History reviewed   No pertinent family history  SOCIAL HISTORY:  Social History     Tobacco Use    Smoking status: Current Every Day Smoker     Packs/day: 0 50    Smokeless tobacco: Never Used   Vaping Use    Vaping Use: Some days    Substances: Nicotine, Flavoring   Substance Use Topics    Alcohol use: Not Currently    Drug use: Never       MEDICATIONS:    Current Outpatient Medications:     ibuprofen (MOTRIN) 600 mg tablet, Take 1 tablet (600 mg total) by mouth every 6 (six) hours as needed for mild pain (Patient not taking: Reported on 2/14/2022 ), Disp: 30 tablet, Rfl: 0    ALLERGIES:  No Known Allergies    ROS:  Review of Systems     Constitutional: Negative for fatigue, fever or loss of appetite  HENT: Negative  Respiratory: Negative for shortness of breath, dyspnea  Cardiovascular: Negative for chest pain/tightness  Gastrointestinal: Negative for abdominal pain, N/V  Endocrine: Negative for cold/heat intolerance, unexplained weight loss/gain  Genitourinary: Negative for flank pain, dysuria, hematuria  Musculoskeletal:  Negative for arthralgia   Skin: Negative for rash  Neurological: Negative for numbness or tingling  Psychiatric/Behavioral: Negative for agitation  _____________________________________________________  PHYSICAL EXAMINATION:    Blood pressure 120/66, pulse 70, height 5' 11" (1 803 m), weight 84 4 kg (186 lb)  Constitutional: Oriented to person, place, and time  Appears well-developed and well-nourished  No distress  HENT:   Head: Normocephalic  Eyes: Conjunctivae are normal  Right eye exhibits no discharge  Left eye exhibits no discharge  No scleral icterus  Cardiovascular: Normal rate  Pulmonary/Chest: Effort normal    Neurological: Alert and oriented to person, place, and time  Skin: Skin is warm and dry  No rash noted  Not diaphoretic  No erythema  No pallor  Psychiatric: Normal mood and affect   Behavior is normal  Judgment and thought content normal       MUSCULOSKELETAL EXAMINATION:   Physical Exam  Ortho Exam    Left upper extremity is neurovascularly intact  Fingers are pink and mobile  Compartments are soft  Range of motion of the shoulder is from 0-170 degrees of forward flexion and abduction  Rotator cuff strength 5/5  Negative apprehension test  Shoulder appears stable  Brisk cap refill  Sensation intact  Objective:  BP Readings from Last 1 Encounters:   04/18/22 120/66      Wt Readings from Last 1 Encounters:   04/18/22 84 4 kg (186 lb)        BMI:   Estimated body mass index is 25 94 kg/m² as calculated from the following:    Height as of this encounter: 5' 11" (1 803 m)  Weight as of this encounter: 84 4 kg (186 lb)            Scribe Attestation    I,:  Shannon Thomas PA-C am acting as a scribe while in the presence of the attending physician :       I,:  Owen Avila DO personally performed the services described in this documentation    as scribed in my presence :

## 2022-05-04 NOTE — PROGRESS NOTES
PT Discharge    Today's date: 2022  Patient name: Myrna Huston  : 1990  MRN: [id number]  Referring provider: Ryann Kendall DO  Dx:   Encounter Diagnosis     ICD-10-CM    1  Tear of left glenoid labrum, subsequent encounter  S43 432D    2  Hill Sachs deformity, left  M21 822    3  Tear of left glenoid labrum, initial encounter  S43 432A        Start Time: 1200  Stop Time: 1300  Total time in clinic (min): 60 minutes    Assessment  Assessment details: The patient had his PT IE on 3/10/22 and he was seen in PT for a total of 5 visits with his last treatment on 3/31/22  He did not show for his next two appointments and did not return for more treatment  Unable to assess his current status, refer to his PT IE for his final assessment  Unable to assess progress towards his goals as he did not return prior to a re-eval being completed  Unable to keep patient on hold, D/C PT secondary to script   D/C PT  Impairments: abnormal or restricted ROM, impaired physical strength, lacks appropriate home exercise program, pain with function and poor posture     Symptom irritability: moderateUnderstanding of Dx/Px/POC: good   Prognosis: good    Goals  ST) Pt  Will be able to perform all upper body dressing and grooming without limitation in 6 weeks  2) Pt  Will be able to complete full day of work activities with minimal complaints and slight modificaitons in 6 weeks  LT) Pt  Will be able to perform all heavy lifting and pushing chores at home without pain or limitation in 12 weeks  2)  Pt  Will be able to perform all work tasks for a full day at pre-morbid levels in 12 weeks  Plan  Plan details: Unable to keep patient on hold, D/C PT secondary to script   D/C PT        Planned modality interventions: cryotherapy, thermotherapy: hydrocollator packs and unattended electrical stimulation  Planned therapy interventions: manual therapy, neuromuscular re-education, patient education, self care, therapeutic exercise, therapeutic activities and home exercise program        Subjective Evaluation    History of Present Illness  Date of onset: 2022  Mechanism of injury: trauma  Mechanism of injury: Drawing a bow          Not a recurrent problem   Quality of life: fair    Pain  Current pain ratin  At best pain ratin  At worst pain ratin  Location: anterior joint line of left shoulder  Quality: dull ache and discomfort  Relieving factors: rest and change in position  Aggravating factors: overhead activity and lifting      Diagnostic Tests  MRI studies: abnormal (left labral tear (3:00-6:00))    FCE comments:  for [employer]   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, hook up pneumatics  He also rolls carts to lift gait, 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  There is some overhead lifting involved  Treatments  No previous or current treatments  Patient Goals  Patient goal: Avoid surgery and be able to safely work without symptoms and return to 100% use of shoulder        Objective     Active Range of Motion   Left Shoulder   Flexion: 160 degrees   Extension: WFL  Abduction: 120 degrees with pain  External rotation 90°: 70 degrees   Internal rotation 90°: 78 degrees     Strength/Myotome Testing     Left Shoulder     Planes of Motion   Flexion: 3+   Abduction: 3+   External rotation at 0°: 3-   Internal rotation at 90°: 3+     Tests     Left Shoulder   Positive anterior load and shift, apprehension and belly press  Negative drop arm, empty can, horn blower, lift-off, Neer's, painful arc, posterior load and shift, Speed's, Yergason's and bicep load